# Patient Record
Sex: MALE | Race: WHITE | NOT HISPANIC OR LATINO | Employment: OTHER | RURAL
[De-identification: names, ages, dates, MRNs, and addresses within clinical notes are randomized per-mention and may not be internally consistent; named-entity substitution may affect disease eponyms.]

---

## 2024-04-16 ENCOUNTER — HOSPITAL ENCOUNTER (EMERGENCY)
Facility: HOSPITAL | Age: 49
Discharge: ANOTHER HOSPITAL | End: 2024-04-16
Payer: COMMERCIAL

## 2024-04-16 VITALS
TEMPERATURE: 98 F | RESPIRATION RATE: 17 BRPM | HEART RATE: 101 BPM | HEIGHT: 71 IN | DIASTOLIC BLOOD PRESSURE: 54 MMHG | OXYGEN SATURATION: 100 % | BODY MASS INDEX: 28 KG/M2 | SYSTOLIC BLOOD PRESSURE: 88 MMHG | WEIGHT: 200 LBS
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
## 2024-04-16 DIAGNOSIS — T14.90XA TRAUMA: ICD-10-CM

## 2024-04-16 DIAGNOSIS — S22.41XA CLOSED FRACTURE OF MULTIPLE RIBS OF RIGHT SIDE, INITIAL ENCOUNTER: ICD-10-CM

## 2024-04-16 DIAGNOSIS — S29.8XXA BLUNT TRAUMA TO CHEST, INITIAL ENCOUNTER: Primary | ICD-10-CM

## 2024-04-16 LAB
ALBUMIN SERPL BCP-MCNC: 2.8 G/DL (ref 3.5–5)
ALBUMIN/GLOB SERPL: 0.9 {RATIO}
ALP SERPL-CCNC: 57 U/L (ref 45–115)
ALT SERPL W P-5'-P-CCNC: 482 U/L (ref 16–61)
ANION GAP SERPL CALCULATED.3IONS-SCNC: 19 MMOL/L (ref 7–16)
AST SERPL W P-5'-P-CCNC: 1162 U/L (ref 15–37)
BASOPHILS # BLD AUTO: 0.02 K/UL (ref 0–0.2)
BASOPHILS # BLD AUTO: 0.02 K/UL (ref 0–0.2)
BASOPHILS NFR BLD AUTO: 0.2 % (ref 0–1)
BASOPHILS NFR BLD AUTO: 0.3 % (ref 0–1)
BILIRUB SERPL-MCNC: 1 MG/DL (ref ?–1.2)
BUN SERPL-MCNC: 9 MG/DL (ref 7–18)
BUN/CREAT SERPL: 8 (ref 6–20)
CALCIUM SERPL-MCNC: 8.3 MG/DL (ref 8.5–10.1)
CHLORIDE SERPL-SCNC: 104 MMOL/L (ref 98–107)
CO2 SERPL-SCNC: 25 MMOL/L (ref 21–32)
CREAT SERPL-MCNC: 1.09 MG/DL (ref 0.7–1.3)
DIFFERENTIAL METHOD BLD: ABNORMAL
DIFFERENTIAL METHOD BLD: ABNORMAL
EGFR (NO RACE VARIABLE) (RUSH/TITUS): 83 ML/MIN/1.73M2
EOSINOPHIL # BLD AUTO: 0.31 K/UL (ref 0–0.5)
EOSINOPHIL # BLD AUTO: 0.39 K/UL (ref 0–0.5)
EOSINOPHIL NFR BLD AUTO: 3.1 % (ref 1–4)
EOSINOPHIL NFR BLD AUTO: 6 % (ref 1–4)
ERYTHROCYTE [DISTWIDTH] IN BLOOD BY AUTOMATED COUNT: 12.4 % (ref 11.5–14.5)
ERYTHROCYTE [DISTWIDTH] IN BLOOD BY AUTOMATED COUNT: 12.7 % (ref 11.5–14.5)
GLOBULIN SER-MCNC: 3.1 G/DL (ref 2–4)
GLUCOSE SERPL-MCNC: 136 MG/DL (ref 70–105)
GLUCOSE SERPL-MCNC: 172 MG/DL (ref 74–106)
GLUCOSE SERPL-MCNC: 189 MG/DL (ref 70–105)
GROUP & RH: NORMAL
HCT VFR BLD AUTO: 39.3 % (ref 40–54)
HCT VFR BLD AUTO: 44.9 % (ref 40–54)
HGB BLD-MCNC: 13.3 G/DL (ref 13.5–18)
HGB BLD-MCNC: 15.3 G/DL (ref 13.5–18)
INDIRECT COOMBS: NORMAL
LACTATE SERPL-SCNC: 2 MMOL/L (ref 0.4–2)
LACTATE SERPL-SCNC: 5.2 MMOL/L (ref 0.4–2)
LYMPHOCYTES # BLD AUTO: 1.19 K/UL (ref 1–4.8)
LYMPHOCYTES # BLD AUTO: 3.88 K/UL (ref 1–4.8)
LYMPHOCYTES NFR BLD AUTO: 12 % (ref 27–41)
LYMPHOCYTES NFR BLD AUTO: 59.7 % (ref 27–41)
MCH RBC QN AUTO: 33.1 PG (ref 27–31)
MCH RBC QN AUTO: 33.3 PG (ref 27–31)
MCHC RBC AUTO-ENTMCNC: 33.8 G/DL (ref 32–36)
MCHC RBC AUTO-ENTMCNC: 34.1 G/DL (ref 32–36)
MCV RBC AUTO: 97.8 FL (ref 80–96)
MCV RBC AUTO: 97.8 FL (ref 80–96)
MONOCYTES # BLD AUTO: 0.42 K/UL (ref 0–0.8)
MONOCYTES # BLD AUTO: 0.44 K/UL (ref 0–0.8)
MONOCYTES NFR BLD AUTO: 4.4 % (ref 2–6)
MONOCYTES NFR BLD AUTO: 6.5 % (ref 2–6)
MPC BLD CALC-MCNC: 10 FL (ref 9.4–12.4)
MPC BLD CALC-MCNC: 11.7 FL (ref 9.4–12.4)
NEUTROPHILS # BLD AUTO: 1.79 K/UL (ref 1.8–7.7)
NEUTROPHILS # BLD AUTO: 7.98 K/UL (ref 1.8–7.7)
NEUTROPHILS NFR BLD AUTO: 27.5 % (ref 53–65)
NEUTROPHILS NFR BLD AUTO: 80.3 % (ref 53–65)
OHS QRS DURATION: 82 MS
OHS QTC CALCULATION: 494 MS
PLATELET # BLD AUTO: 190 K/UL (ref 150–400)
PLATELET # BLD AUTO: 256 K/UL (ref 150–400)
POTASSIUM SERPL-SCNC: 3.2 MMOL/L (ref 3.5–5.1)
PROT SERPL-MCNC: 5.9 G/DL (ref 6.4–8.2)
RBC # BLD AUTO: 4.02 M/UL (ref 4.6–6.2)
RBC # BLD AUTO: 4.59 M/UL (ref 4.6–6.2)
SODIUM SERPL-SCNC: 145 MMOL/L (ref 136–145)
SPECIMEN OUTDATE: NORMAL
WBC # BLD AUTO: 6.5 K/UL (ref 4.5–11)
WBC # BLD AUTO: 9.94 K/UL (ref 4.5–11)

## 2024-04-16 PROCEDURE — 99285 EMERGENCY DEPT VISIT HI MDM: CPT | Mod: 25

## 2024-04-16 PROCEDURE — 99285 EMERGENCY DEPT VISIT HI MDM: CPT | Mod: ,,, | Performed by: NURSE PRACTITIONER

## 2024-04-16 PROCEDURE — 80053 COMPREHEN METABOLIC PANEL: CPT | Performed by: NURSE PRACTITIONER

## 2024-04-16 PROCEDURE — P9016 RBC LEUKOCYTES REDUCED: HCPCS

## 2024-04-16 PROCEDURE — 93005 ELECTROCARDIOGRAM TRACING: CPT

## 2024-04-16 PROCEDURE — 96361 HYDRATE IV INFUSION ADD-ON: CPT

## 2024-04-16 PROCEDURE — 86901 BLOOD TYPING SEROLOGIC RH(D): CPT

## 2024-04-16 PROCEDURE — 83605 ASSAY OF LACTIC ACID: CPT | Performed by: NURSE PRACTITIONER

## 2024-04-16 PROCEDURE — 96376 TX/PRO/DX INJ SAME DRUG ADON: CPT

## 2024-04-16 PROCEDURE — 99900035 HC TECH TIME PER 15 MIN (STAT)

## 2024-04-16 PROCEDURE — 27000221 HC OXYGEN, UP TO 24 HOURS

## 2024-04-16 PROCEDURE — G0390 TRAUMA RESPONS W/HOSP CRITI: HCPCS | Mod: TRAUMA2

## 2024-04-16 PROCEDURE — 25000003 PHARM REV CODE 250: Performed by: NURSE PRACTITIONER

## 2024-04-16 PROCEDURE — 25500020 PHARM REV CODE 255: Performed by: NURSE PRACTITIONER

## 2024-04-16 PROCEDURE — 93010 ELECTROCARDIOGRAM REPORT: CPT | Mod: ,,, | Performed by: INTERNAL MEDICINE

## 2024-04-16 PROCEDURE — 63600175 PHARM REV CODE 636 W HCPCS: Performed by: NURSE PRACTITIONER

## 2024-04-16 PROCEDURE — 86900 BLOOD TYPING SEROLOGIC ABO: CPT

## 2024-04-16 PROCEDURE — 86850 RBC ANTIBODY SCREEN: CPT

## 2024-04-16 PROCEDURE — 85025 COMPLETE CBC W/AUTO DIFF WBC: CPT | Performed by: NURSE PRACTITIONER

## 2024-04-16 PROCEDURE — 96374 THER/PROPH/DIAG INJ IV PUSH: CPT | Mod: 59

## 2024-04-16 PROCEDURE — 82962 GLUCOSE BLOOD TEST: CPT

## 2024-04-16 PROCEDURE — 36430 TRANSFUSION BLD/BLD COMPNT: CPT

## 2024-04-16 RX ORDER — ONDANSETRON HYDROCHLORIDE 2 MG/ML
4 INJECTION, SOLUTION INTRAVENOUS
Status: COMPLETED | OUTPATIENT
Start: 2024-04-16 | End: 2024-04-16

## 2024-04-16 RX ORDER — SYRINGE AND NEEDLE,INSULIN,1ML 31 GX5/16"
SYRINGE, EMPTY DISPOSABLE MISCELLANEOUS
COMMUNITY
Start: 2024-01-18

## 2024-04-16 RX ORDER — ROSUVASTATIN CALCIUM 10 MG/1
10 TABLET, COATED ORAL
COMMUNITY
Start: 2023-10-17

## 2024-04-16 RX ORDER — FENTANYL CITRATE 50 UG/ML
50 INJECTION, SOLUTION INTRAMUSCULAR; INTRAVENOUS
Status: COMPLETED | OUTPATIENT
Start: 2024-04-16 | End: 2024-04-16

## 2024-04-16 RX ORDER — CETIRIZINE HYDROCHLORIDE 10 MG/1
10 TABLET ORAL DAILY
COMMUNITY
Start: 2023-07-11

## 2024-04-16 RX ORDER — LANSOPRAZOLE 30 MG/1
CAPSULE, DELAYED RELEASE ORAL
COMMUNITY
Start: 2024-01-17

## 2024-04-16 RX ORDER — INSULIN DEGLUDEC 100 U/ML
23 INJECTION, SOLUTION SUBCUTANEOUS
COMMUNITY
Start: 2023-12-21 | End: 2024-12-20

## 2024-04-16 RX ORDER — LISINOPRIL AND HYDROCHLOROTHIAZIDE 12.5; 2 MG/1; MG/1
1 TABLET ORAL DAILY
COMMUNITY
Start: 2023-05-18

## 2024-04-16 RX ORDER — INSULIN ASPART INJECTION 100 [IU]/ML
7-10 INJECTION, SOLUTION SUBCUTANEOUS
COMMUNITY

## 2024-04-16 RX ORDER — SILDENAFIL 100 MG/1
TABLET, FILM COATED ORAL
COMMUNITY
Start: 2024-02-26

## 2024-04-16 RX ORDER — HYDROCODONE BITARTRATE AND ACETAMINOPHEN 500; 5 MG/1; MG/1
TABLET ORAL
Status: DISCONTINUED | OUTPATIENT
Start: 2024-04-16 | End: 2024-04-16 | Stop reason: HOSPADM

## 2024-04-16 RX ORDER — PEN NEEDLE, DIABETIC 30 GX3/16"
NEEDLE, DISPOSABLE MISCELLANEOUS
COMMUNITY
Start: 2023-07-05

## 2024-04-16 RX ORDER — NAPROXEN SODIUM 220 MG/1
81 TABLET, FILM COATED ORAL DAILY
COMMUNITY

## 2024-04-16 RX ADMIN — SODIUM CHLORIDE 1000 ML: 9 INJECTION, SOLUTION INTRAVENOUS at 07:04

## 2024-04-16 RX ADMIN — FENTANYL CITRATE 50 MCG: 50 INJECTION, SOLUTION INTRAMUSCULAR; INTRAVENOUS at 07:04

## 2024-04-16 RX ADMIN — SODIUM CHLORIDE 1000 ML: 9 INJECTION, SOLUTION INTRAVENOUS at 08:04

## 2024-04-16 RX ADMIN — FENTANYL CITRATE 50 MCG: 50 INJECTION, SOLUTION INTRAMUSCULAR; INTRAVENOUS at 10:04

## 2024-04-16 RX ADMIN — FENTANYL CITRATE 50 MCG: 50 INJECTION, SOLUTION INTRAMUSCULAR; INTRAVENOUS at 09:04

## 2024-04-16 RX ADMIN — IOPAMIDOL 100 ML: 755 INJECTION, SOLUTION INTRAVENOUS at 08:04

## 2024-04-16 RX ADMIN — FENTANYL CITRATE 50 MCG: 50 INJECTION, SOLUTION INTRAMUSCULAR; INTRAVENOUS at 08:04

## 2024-04-16 RX ADMIN — ONDANSETRON 4 MG: 2 INJECTION INTRAMUSCULAR; INTRAVENOUS at 10:04

## 2024-04-16 NOTE — ED NOTES
[facility] faxed and called to verify fax received for transport to [facility].
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
## 2025-01-24 ENCOUNTER — HOSPITAL ENCOUNTER (EMERGENCY)
Facility: HOSPITAL | Age: 50
Discharge: HOME | End: 2025-01-24
Payer: COMMERCIAL

## 2025-01-24 VITALS
TEMPERATURE: 99 F | BODY MASS INDEX: 25.76 KG/M2 | WEIGHT: 184 LBS | RESPIRATION RATE: 18 BRPM | OXYGEN SATURATION: 97 % | SYSTOLIC BLOOD PRESSURE: 129 MMHG | DIASTOLIC BLOOD PRESSURE: 88 MMHG | HEIGHT: 71 IN | HEART RATE: 120 BPM

## 2025-01-24 DIAGNOSIS — J93.9 PNEUMOTHORAX ON RIGHT: Primary | ICD-10-CM

## 2025-01-24 PROCEDURE — 99284 EMERGENCY DEPT VISIT MOD MDM: CPT | Mod: ,,, | Performed by: NURSE PRACTITIONER

## 2025-01-24 PROCEDURE — 99284 EMERGENCY DEPT VISIT MOD MDM: CPT | Mod: 25

## 2025-01-24 NOTE — ED TRIAGE NOTES
Patient arrived to Ed via private vehicle with no c/o discomfort at all. Stated that he was called by [facility] staff to go to ER and have a follow up CT scan done of chest, due to an abnormal chest xray that was done at [facility] today. Patient is awake and alert.oriented x 4, denies chest pain or sob, no signs of distress noted.
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
## 2025-03-05 ENCOUNTER — HOSPITAL ENCOUNTER (EMERGENCY)
Facility: HOSPITAL | Age: 50
Discharge: HOME | End: 2025-03-05
Payer: COMMERCIAL

## 2025-03-05 VITALS
OXYGEN SATURATION: 100 % | HEIGHT: 71 IN | RESPIRATION RATE: 16 BRPM | HEART RATE: 102 BPM | SYSTOLIC BLOOD PRESSURE: 120 MMHG | BODY MASS INDEX: 25.9 KG/M2 | DIASTOLIC BLOOD PRESSURE: 75 MMHG | TEMPERATURE: 98 F | WEIGHT: 185 LBS
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
## 2025-03-05 DIAGNOSIS — M25.561 ACUTE PAIN OF RIGHT KNEE: Primary | ICD-10-CM

## 2025-03-05 DIAGNOSIS — S39.012A STRAIN OF LUMBAR REGION, INITIAL ENCOUNTER: ICD-10-CM

## 2025-03-05 PROCEDURE — 99284 EMERGENCY DEPT VISIT MOD MDM: CPT | Mod: GF,,, | Performed by: NURSE PRACTITIONER

## 2025-03-05 PROCEDURE — 99284 EMERGENCY DEPT VISIT MOD MDM: CPT | Mod: 25

## 2025-03-05 NOTE — ED PROVIDER NOTES
Encounter Date: 3/5/2025       History     Chief Complaint   Patient presents with    Fall     Pt presents to the Ed for a fall that occurred 2 days ago. Pt has in lower back pain and right knee. Pt is able to walk and move all limbs. Pt denies thinners and hitting his head.    Knee Pain    Back Pain     49 yr old WM with PMH of DM, GERD, HTN to ED with c/o right knee pain and lower back pain s/p fell 2 days ago.  States with knee pain is worse with activity / ambulation.  The back pain is worse with bending and twisting.  States took Ibuprofen this morning that helped.  States has appt to see [facility] Friday    The history is provided by the patient.   Fall  The accident occurred two days ago. The fall occurred while walking. He landed on [place]. The point of impact was the right knee. The pain is present in the back and right knee. He was Ambulatory at the scene. There was No entrapment after the fall. There was No drug use involved in the accident. There was No alcohol use involved in the accident. The symptoms are aggravated by activity and extension. He has tried ice for the symptoms. The treatment provided mild relief.     Review of patient's allergies indicates:  No Known Allergies  Past Medical History:   Diagnosis Date    Diabetes mellitus     GERD (gastroesophageal reflux disease)     Hypertension     Male erectile dysfunction, unspecified     Pneumothorax, unspecified 2024    Right side     Past Surgical History:   Procedure Laterality Date    RIB FRACTURE SURGERY Right      No family history on file.  Social History[1]  Review of Systems   Constitutional: Negative.    Respiratory: Negative.     Cardiovascular: Negative.    Musculoskeletal:  Positive for arthralgias.        Right knee pain   Skin: Negative.        Physical Exam     Initial Vitals [03/05/25 0954]   BP Pulse Resp Temp SpO2   120/75 102 16 98.2 °F (36.8 °C) 100 %      MAP       --         Physical Exam    Nursing note and vitals  reviewed.  Constitutional: He appears well-developed and well-nourished.   Cardiovascular:  Normal rate and regular rhythm.           Pulmonary/Chest: Breath sounds normal.   Musculoskeletal:         General: Tenderness present.        Arms:       Right knee: No swelling, deformity, effusion, erythema, ecchymosis, lacerations or crepitus. Decreased range of motion. Tenderness present over the MCL. No MCL laxity. Normal alignment.      Instability Tests: Anterior drawer test negative. Posterior drawer test negative. Anterior Lachman test negative. Medial Reema test negative and lateral Reema test negative.      Left knee: Normal.     Neurological: He is alert and oriented to person, place, and time. GCS score is 15. GCS eye subscore is 4. GCS verbal subscore is 5. GCS motor subscore is 6.   Skin: Skin is warm and dry.         Medical Screening Exam   See Full Note    ED Course   Procedures  Labs Reviewed - No data to display       Imaging Results              X-Ray Knee 1 or 2 View Right (Final result)  Result time 03/05/25 12:06:25   Procedure changed from X-Ray Knee 3 View Right     Final result by Domenic Storm MD (03/05/25 12:06:25)                   Impression:      No convincing evidence of acute fracture or dislocation.      Electronically signed by: Domenic Storm  Date:    03/05/2025  Time:    12:06               Narrative:    EXAMINATION:  XR KNEE 1 OR 2 VIEW RIGHT    CLINICAL HISTORY:  fall;  Unspecified fall, initial encounter    TECHNIQUE:  AP and lateral views of the right knee were performed.    COMPARISON:  None.    FINDINGS:  No convincing evidence of acute fracture or dislocation. Joint spaces appear maintained.  No large joint effusion. No evidence of radiopaque foreign body.                                       X-Ray Lumbar Spine Ap And Lateral (Final result)  Result time 03/05/25 12:05:38      Final result by Domenic Storm MD (03/05/25 12:05:38)                   Impression:       No convincing evidence of acute displaced fracture or traumatic subluxation.      Electronically signed by: Domenic Storm  Date:    03/05/2025  Time:    12:05               Narrative:    EXAMINATION:  XR LUMBAR SPINE AP AND LATERAL    CLINICAL HISTORY:  fall;    TECHNIQUE:  AP, lateral and spot images were performed of the lumbar spine.    COMPARISON:  None    FINDINGS:  There appear to be 5 non-rib-bearing lumbar type vertebra.  No convincing evidence of acute displaced fracture or traumatic subluxation.    Degenerative endplate and facet sclerosis.    Moderate volume colonic stool.  No definite radiopaque foreign body.                                       Medications - No data to display  Medical Decision Making  49 yr old WM with PMH of DM, GERD, HTN to ED with c/o right knee pain and lower back pain s/p fell 2 days ago.  States with knee pain is worse with activity / ambulation.  The back pain is worse with bending and twisting.  States took Ibuprofen this morning that helped.  [facility] has appt [facility] Friday      Amount and/or Complexity of Data Reviewed  Radiology: ordered.     Details: No acute findings                                      Clinical Impression:   Final diagnoses:  [W19.XXXA] Fall  [M25.561] Acute pain of right knee (Primary)  [S39.012A] Strain of lumbar region, initial encounter        ED Disposition Condition    Discharge Stable          ED Prescriptions    None       Follow-up Information       Follow up With Specialties Details Why Contact Info    [facility]    [place] [address]  [phone]                 Licha Ware, [facility]  03/05/25 1144         [1]   Social History  Tobacco Use    Smoking status: Never    Smokeless tobacco: Never   Substance Use Topics    Alcohol use: Not Currently    Drug use: Never        Licha Ware, JO  03/05/25 1302

## 2025-03-05 NOTE — DISCHARGE INSTRUCTIONS
Wear ace and use crutches to limit weight bearing. Take tylenol or ibuprofen as directed if needed for pain    Follow up with ortho as planned.  Return for worsening condition or emergency needs.